# Patient Record
Sex: MALE | Race: BLACK OR AFRICAN AMERICAN | NOT HISPANIC OR LATINO | URBAN - METROPOLITAN AREA
[De-identification: names, ages, dates, MRNs, and addresses within clinical notes are randomized per-mention and may not be internally consistent; named-entity substitution may affect disease eponyms.]

---

## 2017-04-06 ENCOUNTER — HISTORICAL (OUTPATIENT)
Dept: ADMINISTRATIVE | Facility: HOSPITAL | Age: 72
End: 2017-04-06

## 2019-03-14 ENCOUNTER — HOSPITAL ENCOUNTER (OUTPATIENT)
Dept: MEDSURG UNIT | Facility: HOSPITAL | Age: 74
End: 2019-03-15
Attending: INTERNAL MEDICINE | Admitting: INTERNAL MEDICINE

## 2019-03-15 LAB
ABS NEUT (OLG): 8.29 X10(3)/MCL (ref 2.1–9.2)
ALBUMIN SERPL-MCNC: 3.9 GM/DL (ref 3.4–5)
ALBUMIN/GLOB SERPL: 1.2 RATIO (ref 1.1–2)
ALP SERPL-CCNC: 56 UNIT/L (ref 50–136)
ALT SERPL-CCNC: 33 UNIT/L (ref 12–78)
AST SERPL-CCNC: 20 UNIT/L (ref 15–37)
BASOPHILS # BLD AUTO: 0 X10(3)/MCL (ref 0–0.2)
BASOPHILS NFR BLD AUTO: 0 %
BILIRUB SERPL-MCNC: 0.9 MG/DL (ref 0.2–1)
BILIRUBIN DIRECT+TOT PNL SERPL-MCNC: 0.3 MG/DL (ref 0–0.5)
BILIRUBIN DIRECT+TOT PNL SERPL-MCNC: 0.6 MG/DL (ref 0–0.8)
BUN SERPL-MCNC: 13 MG/DL (ref 7–18)
CALCIUM SERPL-MCNC: 8.9 MG/DL (ref 8.5–10.1)
CHLORIDE SERPL-SCNC: 104 MMOL/L (ref 98–107)
CO2 SERPL-SCNC: 27 MMOL/L (ref 21–32)
CREAT SERPL-MCNC: 0.86 MG/DL (ref 0.7–1.3)
ERYTHROCYTE [DISTWIDTH] IN BLOOD BY AUTOMATED COUNT: 12.2 % (ref 11.5–17)
GLOBULIN SER-MCNC: 3.2 GM/DL (ref 2.4–3.5)
GLUCOSE SERPL-MCNC: 111 MG/DL (ref 74–106)
HCT VFR BLD AUTO: 40.5 % (ref 42–52)
HGB BLD-MCNC: 14.9 GM/DL (ref 14–18)
LYMPHOCYTES # BLD AUTO: 1.3 X10(3)/MCL (ref 0.6–4.6)
LYMPHOCYTES NFR BLD AUTO: 13 % (ref 13–40)
MACROCYTES BLD QL SMEAR: 2 /MCL
MCH RBC QN AUTO: 41.5 PG (ref 27–31)
MCHC RBC AUTO-ENTMCNC: 36.8 GM/DL (ref 33–36)
MCV RBC AUTO: 112.8 FL (ref 80–94)
MONOCYTES # BLD AUTO: 0.4 X10(3)/MCL (ref 0.1–1.3)
MONOCYTES NFR BLD AUTO: 4 %
NEUTROPHILS # BLD AUTO: 8.29 X10(3)/MCL (ref 2.1–9.2)
NEUTROPHILS NFR BLD AUTO: 83 %
NRBC BLD AUTO-RTO: 0.2 % (ref 0–0.2)
PLATELET # BLD AUTO: 222 X10(3)/MCL (ref 130–400)
PLATELET # BLD EST: NORMAL 10*3/UL
PMV BLD AUTO: 10.5 FL (ref 7.4–10.4)
POTASSIUM SERPL-SCNC: 4.4 MMOL/L (ref 3.5–5.1)
PROT SERPL-MCNC: 7.1 GM/DL (ref 6.4–8.2)
RBC # BLD AUTO: 3.59 X10(6)/MCL (ref 4.7–6.1)
SODIUM SERPL-SCNC: 138 MMOL/L (ref 136–145)
WBC # SPEC AUTO: 10 X10(3)/MCL (ref 4.5–11.5)

## 2019-05-09 ENCOUNTER — HISTORICAL (OUTPATIENT)
Dept: RADIOLOGY | Facility: HOSPITAL | Age: 74
End: 2019-05-09

## 2019-06-05 ENCOUNTER — HISTORICAL (OUTPATIENT)
Dept: SURGERY | Facility: HOSPITAL | Age: 74
End: 2019-06-05

## 2019-06-14 ENCOUNTER — HISTORICAL (OUTPATIENT)
Dept: SURGERY | Facility: HOSPITAL | Age: 74
End: 2019-06-14

## 2019-07-19 ENCOUNTER — HISTORICAL (OUTPATIENT)
Dept: SURGERY | Facility: HOSPITAL | Age: 74
End: 2019-07-19

## 2019-07-20 LAB — GRAM STN SPEC: NORMAL

## 2019-07-23 LAB — FINAL CULTURE: NORMAL

## 2019-07-30 ENCOUNTER — HISTORICAL (OUTPATIENT)
Dept: CARDIOLOGY | Facility: HOSPITAL | Age: 74
End: 2019-07-30

## 2019-07-31 ENCOUNTER — HISTORICAL (OUTPATIENT)
Dept: CARDIOLOGY | Facility: HOSPITAL | Age: 74
End: 2019-07-31

## 2019-08-19 LAB — FINAL CULTURE: NORMAL

## 2019-10-04 ENCOUNTER — HISTORICAL (OUTPATIENT)
Dept: CARDIOLOGY | Facility: HOSPITAL | Age: 74
End: 2019-10-04

## 2019-10-14 ENCOUNTER — HISTORICAL (OUTPATIENT)
Dept: ADMINISTRATIVE | Facility: HOSPITAL | Age: 74
End: 2019-10-14

## 2019-11-13 ENCOUNTER — HISTORICAL (OUTPATIENT)
Dept: CARDIOLOGY | Facility: HOSPITAL | Age: 74
End: 2019-11-13

## 2020-02-28 ENCOUNTER — HISTORICAL (OUTPATIENT)
Dept: MEDSURG UNIT | Facility: HOSPITAL | Age: 75
End: 2020-02-28

## 2020-09-23 ENCOUNTER — HISTORICAL (OUTPATIENT)
Dept: CARDIOLOGY | Facility: HOSPITAL | Age: 75
End: 2020-09-23

## 2022-04-09 ENCOUNTER — HISTORICAL (OUTPATIENT)
Dept: ADMINISTRATIVE | Facility: HOSPITAL | Age: 77
End: 2022-04-09

## 2022-04-25 VITALS
WEIGHT: 230 LBS | DIASTOLIC BLOOD PRESSURE: 80 MMHG | HEIGHT: 75 IN | SYSTOLIC BLOOD PRESSURE: 142 MMHG | OXYGEN SATURATION: 98 % | BODY MASS INDEX: 28.6 KG/M2

## 2022-04-30 NOTE — OP NOTE
Patient:   Hugo Valenzuela             MRN: [id number]            FIN: [id number]               Age:   73 years     Sex:  Male     :  1945   Associated Diagnoses:   None   Author:   Kin iPckett MD      Operative Note   Operative Information   Date/ Time:  2019 09:20:00.     Procedures Performed:   1.  Left elbow olecranon bursa excision.     Preoperative Diagnosis:   1.  Left elbow olecranon bursitis.     Postoperative Diagnosis:   Same.     Surgeon: Kin Pickett MD     Anesthesia:   Region anesthesia with axillary nerve block.     Esimated blood loss: No blood loss.     Complications: None.     Notes:   DVT prophylaxis: Patient placed on aspirin for 1 week  Instrumentation: None.     Procedure in detail:    Olecranon bursa excision  The patient was placed in the prone position and placed on padded chest rolls. The non-operative extremity was well padded and placed along the side of the body in a normal position. A padded roll was made and the operative extremity was place on this padded roll. The operative site was prepped and draped in the normal sterile fashion. A time out was performed to confirm correct operative extremity, allergies, and antibiotic infusion.  An incision was made a curved laterally instead of directly over the olecranon tip.  A soft tissue dissection was carried out and I could visualize the bursal sac.  The bursa sac was excised.  There were remaining scar tissue nodules that were removed deep to the bursal sac.  I then used a Bovie to the soft tissues to prevent cannulation of excessive bleeding and recurrence of the sac.    I then irrigated out the wound thoroughly.  I then closed with #1 Vicryl to collapse the open and redundant space.  I then closed with 2-0 Vicryl subcutaneously and then a 3-0 nylon for the skin..

## 2022-04-30 NOTE — OP NOTE
Patient:   Hugo Valenzuela             MRN: [id number]            FIN: [id number]               Age:   74 years     Sex:  Male     :  1945   Associated Diagnoses:   None   Author:   Kin Pickett MD      Operative Note   Operative Information   Date/ Time:  2019 12:30:00.     Procedures Performed:   1.  Left elbow olecranon wound dehiscence debridement and closure.     Preoperative Diagnosis:   1.  Left elbow olecranon wound dehiscence.     Postoperative Diagnosis:   Same.     Surgeon: Kin Pickett MD     Anesthesia:   General anesthesia.     Esimated blood loss: loss less than  10  cc.     Complications: None.     Notes:   DVT prophylaxis: Not indicated  Instrumentation: None.     Procedure in detail:    This patient was brought room and placed on the table in supine position his left upper extremity was prepped and draped in normal sterile fashion.  A timeout procedure was performed.    I then started with obtaining cultures of the wound.  I then moved to using a curette for debridement of the olecranon wound.  After this I then irrigated the wound very thoroughly with normal saline.  I then placed some vancomycin powder solution into the wound.  Next I debrided the edges to get a bleeding base so that I can get side to side skin healing.    I then used 3-0 nylon suture and did a horizontal mattress suture to put good tension on the skin.  I then placed some Dermabond over the area.  I placed elbow the range of motion to make sure there was not much tension on the wound repair.  I could place the patient elbow to 100 degrees of flexion without any tension on the wound.    I then placed a sterile dressing and also place the patient in a posterior slab arm splint at 90 degrees.  The goal be to remove the splint off of his arm over the next 3 days.    The patient tolerated procedure well without any complications..

## 2022-05-02 NOTE — HISTORICAL [facility] CERNER
This is a historical note converted from [employer]. Formatting and pictures may have been removed.  Please reference Earl for original formatting and attached multimedia. Chief Complaint  BILATERAL SHOULDER PAIN , C/O PAIN 5/10  History of Present Illness  This is a 71-year-old male that comes in with a chronic history of bilateral shoulder pain. ?He states his pain is moderate to severe.? He has been treated in the past with?oral anti-inflammatory medication and injections.? He complains primarily of night pain and?weakness with certain movements.? The left shoulder is worse on the right.  Review of Systems  ??GENERAL: No fevers, chills, unexpected weight loss or gain  ??MUSCULOSKELETAL: Joint pain, extremity pain  Physical Exam  Vitals & Measurements  BP:?150/78? HT:?182?cm? HT:?182?cm? WT:?102?kg? WT:?102?kg? BMI:?30.79?  General: Well-developed, well-nourished.  Neuro: Alert and oriented x 3.  Psych: Normal mood and affect.  CV: Palpable radial pulses.  Resp: Smooth and unlabored.  Skin: No evidence of focal lesions or trauma.  Hem/Imm/Lymph: No evidence of lymphangitis or adenopathy.  Gait: No trendelenburg gait.  DTR: 2+, no hypo or hyperreflexia.  Coordination and Balance: No tremors or abnormal station.  Bilateral?shoulder Exam:  No obvious deformity. No medial or lateral scapula winging. Forward flexion to 120 degrees and abduction to 120 degrees and external rotation 80 degrees is and internal rotation is 80 degrees. ?Positive?empty can, Whipple, Tracey, impingement, AC joint tenderness. Negative biceps groove tenderness, Bruner?s, Yergason?s, Speed test. Negative Apprehension and Relocation test. 5/5 strength, normal skin appearance and palpable pulses distally. Sensibility normal.  Assessment/Plan  1.?Nontraumatic incomplete bilateral rotator cuff tear  This patients history and physical findings I am very confident he has?bilateral rotator cuff tears.? Also his x-rays point to this with?significant  acromioclavicular arthrosis and minimal?superior migration.? For this reason, I will order bilateral shoulder MRIs.? These will need to be repaired if?torn.  Ordered:  methylPREDNISolone, 80 mg, Intra-Articular, Once, first dose 04/06/17 17:03:00 CDT, stop date 04/06/17 17:03:00 CDT, NOW, 24  Asp/Inj (Bilateral) Major Jnt/Bursa PC, 04/06/17 17:03:00 CDT, [facility] - Ortho Surg SW, Routine, 04/06/17 17:03:00 CDT  Office/Outpatient Visit Level 3 New [id number] PC, Nontraumatic incomplete bilateral rotator cuff tear, [facility] - Ortho Surg SW, 04/06/17 17:03:00 CDT  ?   Problem List/Past Medical History  Obesity  Historical  No historical problems  Medications  amlodipine 5 mg oral tablet, 5 mg, 1 tab(s), Oral, Daily  Aspir 81 oral delayed release tablet, 81 mg, 1 tab(s), Oral, Daily  benazepril-hydrochlorothiazide 20 mg-12.5 mg oral tablet, 1 tab(s), Oral, Daily  CeleBREX 200 mg oral capsule, 200 mg, 1 cap(s), Oral, BID  DEPO-Medrol, 80 mg, Intra-Articular, Once  Lovaza, 2000 mg, Oral, BID  Lunesta 3 mg oral tablet, 3 mg, 1 tab(s), Oral, Once a day (at bedtime), PRN  simvastatin 40 mg oral tablet, 40 mg, 1 tab(s), Oral, Once a day (at bedtime)  Allergies  No Known Allergies  Social History  Alcohol  Current  Tobacco  Never smoker  Diagnostic Results  This patients x-rays demonstrate bilateral shoulders with significant acromioclavicular joint arthrosis.      Bilateral?subacromial injection:  The posterior acromium was prepped and draped in normal sterile fashion. Just inferior to the posterior acromium a 25-gauge needle was inserted into the subacromial space. A solution with 1cc of 40mg depomedrol and 2cc of 0.5% Marcaine was injected into the subacromial space. Patient tolerated procedure well without any complications.

## 2023-12-12 DIAGNOSIS — M25.562 LEFT KNEE PAIN: Primary | ICD-10-CM
